# Patient Record
Sex: MALE | Race: WHITE | Employment: UNEMPLOYED
[De-identification: names, ages, dates, MRNs, and addresses within clinical notes are randomized per-mention and may not be internally consistent; named-entity substitution may affect disease eponyms.]

---
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
## 2023-08-07 ENCOUNTER — HOSPITAL ENCOUNTER (OUTPATIENT)
Dept: PHYSICAL THERAPY | Facility: CLINIC | Age: 23
Setting detail: THERAPIES SERIES
Discharge: HOME | End: 2023-08-07
Payer: COMMERCIAL

## 2023-08-07 PROCEDURE — 97161 PT EVAL LOW COMPLEX 20 MIN: CPT

## 2023-08-07 PROCEDURE — 97110 THERAPEUTIC EXERCISES: CPT

## 2023-08-07 NOTE — CONSULTS
[] [facility]) - [facility]  Therapy  [address] [facility] [place] [facility].  [phone]  F: [phone] [x] [address] [facility]  [address]   [address]  P: [phone]  F: [phone] [] [address] &  Therapy  [address]  P: [phone]  F: [phone] [] [facility]  P: [phone]  F: [phone] [] [address] [facility]   [address]   [place]: [phone]  F: [phone]        Physical Therapy Upper Extremity Evaluation    Date:  2023  Patient: Antonio Henson  : 2000  MRN: [id number]  Physician: Dr. Sandro Moralez: Thee Ford Plus  Visit Limit: 35   Visits Remainin   Hard Max? [x]  yes      []  no   Medical Diagnosis:   M25.511 (ICD-10-CM) - Pain in right shoulder   M25.521 (ICD-10-CM) - Pain in right elbow   Rehab Codes: M25.511, M25.521, M25.611, M56.621, R29.3, M62.81  Onset Date: 8/3/24 (REFERRAL)   Next 's appt: 23    Subjective:   CC: R shoulder and elbow pain limiting ability to play baseball  HPI: (onset date): Pt is a 23 y.o. male with c/o R shoulder and elbow pain. Pt notes the R shoulder pain began 2 weeks ago when he was pitching in a recreational baseball league. Pt states that when he was pitching he felt a pop and burn in the R shoulder which was associated with numbness. Pt notes at that point he pulled himself from the game and went to the dr. Janet Rader states his R elbow pain has been present since freshman year of high school. Pt states he has undergone therapy before for the elbow but it didn't help. Pt notes that when he followed up with Dr. Cookie King, he told him he probably has a labral tear in the R shoulder and a \"Terry Montano\" injury in the R elbow.  Pt states currently his R elbow is

## 2023-08-10 ENCOUNTER — HOSPITAL ENCOUNTER (OUTPATIENT)
Dept: PHYSICAL THERAPY | Facility: CLINIC | Age: 23
Setting detail: THERAPIES SERIES
Discharge: HOME | End: 2023-08-10
Payer: COMMERCIAL

## 2023-08-10 PROCEDURE — 97110 THERAPEUTIC EXERCISES: CPT

## 2023-08-10 PROCEDURE — 97140 MANUAL THERAPY 1/> REGIONS: CPT

## 2023-08-10 NOTE — FLOWSHEET NOTE
[] [address]  [address] [facility] [place] [facility].  [phone]  F: [phone] [x] [address] [facility]  [address]   [address]  P: [phone]  F: [phone] [] [address]  [address] [facility]  P: [phone]  F: [phone] [] [facility] [place]: [phone]  F: [phone] [] [address] [facility]  [address] [facility]   [address]   P: [phone]  F: [phone]  [] [address] [facility].   P: [phone]  F: [phone] [] [address] [facility]  [address]  P: [phone]  F: [phone] [] [address] [facility]  [address] [facility]  [place]: [phone]  F: [phone] [] [address] [facility] [address]  [place]: [phone]  F: [phone]      Physical Therapy Daily Treatment Note    Date:  8/10/2023  Patient Name:  Dorian Lopez    :  2000  MRN: [id number]  Physician: Dr. Ellis Blocker: Elza Magallon  Visit Limit: 35   Visits Remainin   Hard Max? [x]  yes      []  no   Medical Diagnosis:   M25.511 (ICD-10-CM) - Pain in right shoulder   M25.521 (ICD-10-CM) - Pain in right elbow   Rehab Codes: M25.511, M25.521, M25.611, M56.621, R29.3, M62.81  Onset Date: 8/3/24 (Ketty Arias)                 Next Dr.'s appt: 23    Visit# / total visits: ;      Cancels/No Shows: 0/0    Subjective:    Pain:  [] Yes  [x] No Location: R shoulder; R elbow Pain Rating: (0-10 scale) 0/10 (R shoulder/elbow)  Pain altered Tx:  [x] No  [] Yes

## 2023-08-14 ENCOUNTER — HOSPITAL ENCOUNTER (OUTPATIENT)
Dept: PHYSICAL THERAPY | Facility: CLINIC | Age: 23
Setting detail: THERAPIES SERIES
Discharge: HOME | End: 2023-08-14
Payer: COMMERCIAL

## 2023-08-14 PROCEDURE — 97140 MANUAL THERAPY 1/> REGIONS: CPT

## 2023-08-14 PROCEDURE — 97110 THERAPEUTIC EXERCISES: CPT

## 2023-08-14 NOTE — FLOWSHEET NOTE
[] [address]  [address] [facility] [place] [facility].  [phone]  F: [phone] [x] [address] [facility]  [address]   [address]  P: [phone]  F: [phone] [] [address]  [address] [facility]  P: [phone]  F: [phone] [] [place]: [phone]  F: [phone] [] [address] [facility]  [address] [facility]   [address]   P: [phone]  F: [phone]  [] [address].   P: [phone]  F: [phone] [] [address] [facility]  [address]  P: [phone]  F: [phone] [] [address] [facility]  [address] [facility]  [place]: [phone]  F: [phone] [] [address] [facility] [address]  [place]: [phone]  F: [phone]      Physical Therapy Daily Treatment Note    Date:  2023  Patient Name:  Nik Reyes    :  2000  MRN: [id number]  Physician: Dr. Anjana Magaña: Nura Magallon  Visit Limit: 35   Hard Max? [x]  yes      []  no   Medical Diagnosis:   M25.511 (ICD-10-CM) - Pain in right shoulder   M25.521 (ICD-10-CM) - Pain in right elbow   Rehab Codes: M25.511, M25.521, M25.611, M56.621, R29.3, M62.81  Onset Date: 8/3/24 (Boni Horta)                 Next 's appt: 23    Visit# / total visits: 3/12;      Cancels/No Shows: 0/0    Subjective:    Pain:  [] Yes  [x] No Location: R shoulder; R elbow Pain Rating: (0-10 scale) 4.5/10 (R elbow); 0/10 (R shoulder)  Pain altered Tx:  [x] No  [] Yes

## 2023-08-17 ENCOUNTER — HOSPITAL ENCOUNTER (OUTPATIENT)
Dept: PHYSICAL THERAPY | Facility: CLINIC | Age: 23
Setting detail: THERAPIES SERIES
Discharge: HOME | End: 2023-08-17
Payer: COMMERCIAL

## 2023-08-17 PROCEDURE — 97140 MANUAL THERAPY 1/> REGIONS: CPT

## 2023-08-17 PROCEDURE — 97110 THERAPEUTIC EXERCISES: CPT

## 2023-08-17 NOTE — FLOWSHEET NOTE
side lying ER and utilized towel roll this date with improved tolerance. Added 2# weight to rhythmic stabilization exercise with resisted HADD being most weak. Encouraged use of ice to elbow for management of ms soreness. [] No change. [] Other:  [x] Patient would continue to benefit from skilled physical therapy services in order to:  improve R shoulder/elbow strength and stability to return to working out and playing baseball. Problems:    [x] ? Pain: R elbow /shoulder pain   [x] ? ROM: painful R shoulder and elbow ROM  [x] ? Strength: decreased RUE strength, scapular stability, and R  strength  [x] ? Function: UEFS: 66/80, 82.5% max function   [] Other:        Short term goals: MEET IN 6 VISITS Status   Pain: Pt will report less than or equal to 1-2/10 R shoulder and elbow pain when completing daily activities including lifting and reaching. Ongoing   ROM: Pt will demonstrate improved R shoulder AROM with less elbow and shoulder pain to assist with returning to pitching and completing ADLs. Flexion: 170 degrees  Abduction: 180 degrees  IR: T8 Ongoing   Strength: Pt will demonstrate scapular stability with raising and lowering the RUE to promote optimal scapulohumeral rhythm. Ongoing   Home Exercise Program: Pt will demonstrate independence with HEP. Ongoing   Longer term goals: MEET IN 12 VISITS     Pain: Pt will report less than or equal to 0-1/10 R shoulder/elbow pain with use of the RUE to complete ADLs and work specific tasks to progress to a return to throwing program.   Ongoing   ROM: Pt will be able to demonstrate full R shoulder AROM with normalized scapular humeral rhythm to assist with progress to a return to throwing program. Ongoing   AROM: Pt will be able to perform repeated elbow flexion and extension with 0-1/10 R elbow pain to improve tolerance to extending the R elbow.  Ongoing    Strength: Pt will demonstrate greater than or equal to 5/5 R shoulder, elbow, and scapular strength and

## 2023-08-28 ENCOUNTER — APPOINTMENT (OUTPATIENT)
Dept: PHYSICAL THERAPY | Facility: CLINIC | Age: 23
End: 2023-08-28
Payer: COMMERCIAL

## 2023-08-29 ENCOUNTER — HOSPITAL ENCOUNTER (OUTPATIENT)
Dept: PHYSICAL THERAPY | Facility: CLINIC | Age: 23
Setting detail: THERAPIES SERIES
Discharge: HOME | End: 2023-08-29
Payer: COMMERCIAL

## 2023-08-29 PROCEDURE — 97110 THERAPEUTIC EXERCISES: CPT

## 2023-08-29 PROCEDURE — 97140 MANUAL THERAPY 1/> REGIONS: CPT

## 2023-08-31 ENCOUNTER — HOSPITAL ENCOUNTER (OUTPATIENT)
Dept: PHYSICAL THERAPY | Facility: CLINIC | Age: 23
Setting detail: THERAPIES SERIES
Discharge: HOME | End: 2023-08-31
Payer: COMMERCIAL

## 2023-08-31 ENCOUNTER — APPOINTMENT (OUTPATIENT)
Dept: PHYSICAL THERAPY | Facility: CLINIC | Age: 23
End: 2023-08-31
Payer: COMMERCIAL

## 2023-08-31 PROCEDURE — 97140 MANUAL THERAPY 1/> REGIONS: CPT

## 2023-08-31 PROCEDURE — 97110 THERAPEUTIC EXERCISES: CPT

## 2023-08-31 NOTE — FLOWSHEET NOTE
Ulnar Deviation with Resistance  - 1 x daily - 2 sets - 15 reps  - Wrist Flexion with Dumbbell  - 1 x daily - 3 sets - 15 reps  - Sidelying Open Book  - 1-2 x daily - 10 reps  Comprehension of Education:  [x] Verbalizes understanding. [] Demonstrates understanding. [] Needs review. [] Demonstrates/verbalizes HEP/Ed previously given. Plan: [x] Continue current frequency toward long and short term goals.     [x] Specific Instructions for subsequent treatments: see above      Time In: 700 a    Time Out: 593 a    Electronically signed by:  Jose Kidd PTA

## 2023-09-05 ENCOUNTER — HOSPITAL ENCOUNTER (OUTPATIENT)
Dept: PHYSICAL THERAPY | Facility: CLINIC | Age: 23
Setting detail: THERAPIES SERIES
Discharge: HOME | End: 2023-09-05
Payer: COMMERCIAL

## 2023-09-05 PROCEDURE — 97110 THERAPEUTIC EXERCISES: CPT

## 2023-09-05 PROCEDURE — 97140 MANUAL THERAPY 1/> REGIONS: CPT

## 2023-09-05 NOTE — FLOWSHEET NOTE
understanding. [] Demonstrates understanding. [] Needs review. [] Demonstrates/verbalizes HEP/Ed previously given. Plan: [x] Continue current frequency toward long and short term goals.     [x] Specific Instructions for subsequent treatments: see above      Time In: 700 a    Time Out: 809 a    Electronically signed by:  Yenny Geiger PT

## 2023-09-07 ENCOUNTER — HOSPITAL ENCOUNTER (OUTPATIENT)
Dept: PHYSICAL THERAPY | Facility: CLINIC | Age: 23
Setting detail: THERAPIES SERIES
Discharge: HOME | End: 2023-09-07
Payer: COMMERCIAL

## 2023-09-07 PROCEDURE — 97140 MANUAL THERAPY 1/> REGIONS: CPT

## 2023-09-07 PROCEDURE — 97110 THERAPEUTIC EXERCISES: CPT

## 2023-09-07 NOTE — FLOWSHEET NOTE
[] [address]  [address] [facility] [place] [facility].  [phone]  F: [phone] [x] [address] [facility]  [address]   [address]  P: [phone]  F: [phone] [] [address]  [address] [facility]  P: [phone]  F: [phone] [] [facility] [place]: [phone]  F: [phone] [] [address] [facility]  [address] [facility]   [address]   P: [phone]  F: [phone]  [] [address] [facility].   P: [phone]  F: [phone] [] [address]  [address]  P: [phone]  F: [phone] [] [address] [facility]  [address] [facility]  [place]: [phone]  F: [phone] [] [address] [place] [facility] [address]  [place]: [phone]  F: [phone]      Physical Therapy Daily Treatment Note    Date:  2023  Patient Name:  Josefa Lizarraga    :  2000  MRN: [id number]  Physician: Dr. Noé Nolen: Jazmine Fly Plus  Visit Limit: 35   Hard Max? [x]  yes      []  no   Medical Diagnosis:   M25.511 (ICD-10-CM) - Pain in right shoulder   M25.521 (ICD-10-CM) - Pain in right elbow   Rehab Codes: M25.511, M25.521, M25.611, M56.621, R29.3, M62.81  Onset Date: 8/3/24 (REFERRAL)                 Next 's appt: 23    Visit# / total visits: ;      Cancels/No Shows: 0/2    Subjective:    Pain:  [x] Yes  [] No Location: R shoulder; R elbow Pain Rating: (0-10 scale) 3/10 (R elbow); 3.5/10 (R shoulder)  Pain altered Tx:  [x] No  [] Yes

## 2023-09-12 ENCOUNTER — HOSPITAL ENCOUNTER (OUTPATIENT)
Dept: PHYSICAL THERAPY | Facility: CLINIC | Age: 23
Setting detail: THERAPIES SERIES
Discharge: HOME | End: 2023-09-12
Payer: COMMERCIAL

## 2023-09-12 PROCEDURE — 97140 MANUAL THERAPY 1/> REGIONS: CPT

## 2023-09-12 PROCEDURE — 97110 THERAPEUTIC EXERCISES: CPT

## 2023-09-14 ENCOUNTER — APPOINTMENT (OUTPATIENT)
Dept: PHYSICAL THERAPY | Facility: CLINIC | Age: 23
End: 2023-09-14
Payer: COMMERCIAL